# Patient Record
Sex: FEMALE | Race: WHITE | NOT HISPANIC OR LATINO | Employment: UNEMPLOYED | URBAN - METROPOLITAN AREA
[De-identification: names, ages, dates, MRNs, and addresses within clinical notes are randomized per-mention and may not be internally consistent; named-entity substitution may affect disease eponyms.]

---

## 2023-11-21 ENCOUNTER — LAB (OUTPATIENT)
Dept: LAB | Facility: LAB | Age: 63
End: 2023-11-21
Payer: COMMERCIAL

## 2023-11-21 DIAGNOSIS — E78.5 HYPERLIPIDEMIA, UNSPECIFIED: Primary | ICD-10-CM

## 2023-11-21 DIAGNOSIS — I10 ESSENTIAL (PRIMARY) HYPERTENSION: ICD-10-CM

## 2023-11-21 LAB
ALBUMIN SERPL BCP-MCNC: 4.5 G/DL (ref 3.4–5)
ALP SERPL-CCNC: 133 U/L (ref 33–136)
ALT SERPL W P-5'-P-CCNC: 14 U/L (ref 7–45)
ANION GAP SERPL CALC-SCNC: 15 MMOL/L (ref 10–20)
AST SERPL W P-5'-P-CCNC: 14 U/L (ref 9–39)
BILIRUB SERPL-MCNC: 0.9 MG/DL (ref 0–1.2)
BUN SERPL-MCNC: 13 MG/DL (ref 6–23)
CALCIUM SERPL-MCNC: 9.8 MG/DL (ref 8.6–10.3)
CHLORIDE SERPL-SCNC: 102 MMOL/L (ref 98–107)
CHOLEST SERPL-MCNC: 146 MG/DL (ref 0–199)
CHOLESTEROL/HDL RATIO: 3.7
CO2 SERPL-SCNC: 28 MMOL/L (ref 21–32)
CREAT SERPL-MCNC: 0.9 MG/DL (ref 0.5–1.05)
GFR SERPL CREATININE-BSD FRML MDRD: 72 ML/MIN/1.73M*2
GLUCOSE SERPL-MCNC: 119 MG/DL (ref 74–99)
HDLC SERPL-MCNC: 39.1 MG/DL
LDLC SERPL CALC-MCNC: 81 MG/DL
NON HDL CHOLESTEROL: 107 MG/DL (ref 0–149)
POTASSIUM SERPL-SCNC: 5 MMOL/L (ref 3.5–5.3)
PROT SERPL-MCNC: 7.4 G/DL (ref 6.4–8.2)
SODIUM SERPL-SCNC: 140 MMOL/L (ref 136–145)
TRIGL SERPL-MCNC: 132 MG/DL (ref 0–149)
VLDL: 26 MG/DL (ref 0–40)

## 2023-11-21 PROCEDURE — 80061 LIPID PANEL: CPT

## 2023-11-21 PROCEDURE — 36415 COLL VENOUS BLD VENIPUNCTURE: CPT

## 2023-11-21 PROCEDURE — 80053 COMPREHEN METABOLIC PANEL: CPT

## 2023-12-07 ENCOUNTER — OFFICE VISIT (OUTPATIENT)
Dept: CARDIOLOGY | Facility: CLINIC | Age: 63
End: 2023-12-07
Payer: COMMERCIAL

## 2023-12-07 VITALS
WEIGHT: 288 LBS | HEIGHT: 64 IN | DIASTOLIC BLOOD PRESSURE: 80 MMHG | HEART RATE: 60 BPM | SYSTOLIC BLOOD PRESSURE: 136 MMHG | BODY MASS INDEX: 49.17 KG/M2

## 2023-12-07 DIAGNOSIS — I51.7 LVH (LEFT VENTRICULAR HYPERTROPHY): ICD-10-CM

## 2023-12-07 DIAGNOSIS — E11.9 DIABETES MELLITUS TYPE II, NON INSULIN DEPENDENT (MULTI): ICD-10-CM

## 2023-12-07 DIAGNOSIS — G47.33 OBSTRUCTIVE SLEEP APNEA: ICD-10-CM

## 2023-12-07 DIAGNOSIS — I25.10 CAD S/P PERCUTANEOUS CORONARY ANGIOPLASTY: ICD-10-CM

## 2023-12-07 DIAGNOSIS — I10 ESSENTIAL HYPERTENSION, BENIGN: ICD-10-CM

## 2023-12-07 DIAGNOSIS — E78.2 MIXED HYPERLIPIDEMIA: Primary | ICD-10-CM

## 2023-12-07 DIAGNOSIS — Z98.61 CAD S/P PERCUTANEOUS CORONARY ANGIOPLASTY: ICD-10-CM

## 2023-12-07 DIAGNOSIS — Z86.79 HISTORY OF VENTRICULAR FIBRILLATION: ICD-10-CM

## 2023-12-07 DIAGNOSIS — Z87.891 FORMER SMOKER: ICD-10-CM

## 2023-12-07 DIAGNOSIS — E03.9 HYPOTHYROIDISM, UNSPECIFIED TYPE: ICD-10-CM

## 2023-12-07 PROBLEM — E78.5 HYPERLIPIDEMIA: Status: ACTIVE | Noted: 2023-12-07

## 2023-12-07 PROCEDURE — 3008F BODY MASS INDEX DOCD: CPT | Performed by: INTERNAL MEDICINE

## 2023-12-07 PROCEDURE — 99214 OFFICE O/P EST MOD 30 MIN: CPT | Performed by: INTERNAL MEDICINE

## 2023-12-07 PROCEDURE — 4010F ACE/ARB THERAPY RXD/TAKEN: CPT | Performed by: INTERNAL MEDICINE

## 2023-12-07 PROCEDURE — 3048F LDL-C <100 MG/DL: CPT | Performed by: INTERNAL MEDICINE

## 2023-12-07 PROCEDURE — 1036F TOBACCO NON-USER: CPT | Performed by: INTERNAL MEDICINE

## 2023-12-07 PROCEDURE — 3075F SYST BP GE 130 - 139MM HG: CPT | Performed by: INTERNAL MEDICINE

## 2023-12-07 PROCEDURE — 3079F DIAST BP 80-89 MM HG: CPT | Performed by: INTERNAL MEDICINE

## 2023-12-07 RX ORDER — RANOLAZINE 500 MG/1
500 TABLET, EXTENDED RELEASE ORAL 2 TIMES DAILY
COMMUNITY
End: 2023-12-07 | Stop reason: SDUPTHER

## 2023-12-07 RX ORDER — NITROGLYCERIN 0.4 MG/1
0.4 TABLET SUBLINGUAL EVERY 5 MIN PRN
COMMUNITY
Start: 2023-07-10 | End: 2023-12-07 | Stop reason: SDUPTHER

## 2023-12-07 RX ORDER — RANOLAZINE 500 MG/1
500 TABLET, EXTENDED RELEASE ORAL 2 TIMES DAILY
Qty: 180 TABLET | Refills: 3 | Status: SHIPPED | OUTPATIENT
Start: 2023-12-07 | End: 2024-12-06

## 2023-12-07 RX ORDER — ATORVASTATIN CALCIUM 40 MG/1
80 TABLET, FILM COATED ORAL DAILY
COMMUNITY
Start: 2019-04-06 | End: 2023-12-07 | Stop reason: SDUPTHER

## 2023-12-07 RX ORDER — ISOSORBIDE MONONITRATE 30 MG/1
90 TABLET, EXTENDED RELEASE ORAL DAILY
COMMUNITY
End: 2023-12-07 | Stop reason: SDUPTHER

## 2023-12-07 RX ORDER — LISINOPRIL 10 MG/1
10 TABLET ORAL DAILY
Qty: 90 TABLET | Refills: 3 | Status: SHIPPED | OUTPATIENT
Start: 2023-12-07 | End: 2024-12-06

## 2023-12-07 RX ORDER — EZETIMIBE 10 MG/1
10 TABLET ORAL DAILY
Qty: 90 TABLET | Refills: 3 | Status: SHIPPED | OUTPATIENT
Start: 2023-12-07 | End: 2024-12-06

## 2023-12-07 RX ORDER — AMLODIPINE BESYLATE 10 MG/1
10 TABLET ORAL DAILY
COMMUNITY
Start: 2023-07-10 | End: 2023-12-07 | Stop reason: SDUPTHER

## 2023-12-07 RX ORDER — NAPROXEN SODIUM 220 MG/1
TABLET, FILM COATED ORAL DAILY
COMMUNITY

## 2023-12-07 RX ORDER — METOPROLOL TARTRATE 25 MG/1
25 TABLET, FILM COATED ORAL 2 TIMES DAILY
COMMUNITY
Start: 2023-07-10 | End: 2023-12-07 | Stop reason: SDUPTHER

## 2023-12-07 RX ORDER — TIZANIDINE 2 MG/1
2 TABLET ORAL EVERY 6 HOURS PRN
COMMUNITY
Start: 2023-07-10

## 2023-12-07 RX ORDER — ATORVASTATIN CALCIUM 80 MG/1
80 TABLET, FILM COATED ORAL DAILY
Qty: 90 TABLET | Refills: 3 | Status: SHIPPED | OUTPATIENT
Start: 2023-12-07 | End: 2024-12-06

## 2023-12-07 RX ORDER — METFORMIN HYDROCHLORIDE 500 MG/1
500 TABLET ORAL
COMMUNITY
Start: 2023-07-10

## 2023-12-07 RX ORDER — CLOPIDOGREL BISULFATE 75 MG/1
75 TABLET ORAL DAILY
COMMUNITY
End: 2023-12-07 | Stop reason: ALTCHOICE

## 2023-12-07 RX ORDER — LISINOPRIL 10 MG/1
10 TABLET ORAL DAILY
COMMUNITY
Start: 2023-07-10 | End: 2023-12-07 | Stop reason: SDUPTHER

## 2023-12-07 RX ORDER — ISOSORBIDE MONONITRATE 30 MG/1
90 TABLET, EXTENDED RELEASE ORAL DAILY
Qty: 270 TABLET | Refills: 3 | Status: SHIPPED | OUTPATIENT
Start: 2023-12-07 | End: 2024-12-06

## 2023-12-07 RX ORDER — METOPROLOL TARTRATE 25 MG/1
25 TABLET, FILM COATED ORAL 2 TIMES DAILY
Qty: 180 TABLET | Refills: 3 | Status: SHIPPED | OUTPATIENT
Start: 2023-12-07 | End: 2024-12-06

## 2023-12-07 RX ORDER — NITROGLYCERIN 0.4 MG/1
0.4 TABLET SUBLINGUAL EVERY 5 MIN PRN
Qty: 25 TABLET | Refills: 5 | Status: SHIPPED | OUTPATIENT
Start: 2023-12-07 | End: 2024-12-06

## 2023-12-07 RX ORDER — AMLODIPINE BESYLATE 10 MG/1
10 TABLET ORAL DAILY
Qty: 90 TABLET | Refills: 3 | Status: SHIPPED | OUTPATIENT
Start: 2023-12-07 | End: 2024-12-06

## 2023-12-07 NOTE — PATIENT INSTRUCTIONS
CHECK YOUR BLOOD PRESSURE 3 TIMES A DAY FOR 2 WEEKS PRIOR TO YOUR NEXT OFFICE VISIT. BRING YOUR BP READINGS AND YOUR BP MACHINE WITH YOU TO YOUR VISIT.    STOP TAKING PLAVIX  START ZETIA 10 MG DAILY    STRESS TEST (GXT) IN 1 YEAR    FOLLOW UP IN 1 YEAR  OBTAIN LAB WORK PRIOR TO THIS VISIT, THIS WILL BE FASTING

## 2023-12-07 NOTE — PROGRESS NOTES
CARDIOLOGY OFFICE VISIT      CHIEF COMPLAINT  Chief Complaint   Patient presents with    Follow-up     1  YEAR        HISTORY OF PRESENT ILLNESS   The patient is a 63-year-old  female with past medical history significant for coronary artery disease status post non-ST elevation myocardial infarction, status post drug-eluting stent to right coronary artery on 2018, dyslipidemia, tobacco abuse (ceased 2018), hyperglycemia, and  morbid obesity, who presents for followup cardiovascular care.      Patient has chronic occasional palpitation lasting seconds usually in the evening, no change.  Denies chest pain, dyspnea exertion. Occasional dizziness without near syncope or leah syncope. Denies nausea, vomiting, edema, claudication.  No routine exercise program.  Home blood pressure readings are under control.          PAST MEDICAL HISTORY: As above plus polycystic ovary disease,  hypothyroid, and hyperglycemia.     PAST SURGICAL HISTORY: Ovarian cyst resection, hernia repair.     SOCIAL HISTORY: The patient quit smoking in 2018, smoked 1  pack of cigarettes per day for the past 12 years. Denies alcohol use.     FAMILY HISTORY: Dad  at 78. I had coronary artery disease,  CABG in his 40s, redo CABG, COPD, hypertension. Mom is  at  82 with coronary disease/PCI in her late 60s, pacemaker and renal  failure.     REVIEW OF SYSTEMS: Negative, noncontributory as previously mentioned  x12 systems.     ASSESSMENT:   Coronary artery disease, status post non-ST elevation myocardial infarction, status post drug-eluting stent to right coronary artery on 2018 with chronic stable angina  History of ventricular fibrillation during cardiac catheterization  Intolerance to myocardial perfusion stress test due to claustrophobia/panic attacks  Dyslipidemia.  Diabetes mellitus.  Hypertension.  Left ventricular hypertrophy  Tobacco abuse - ceased in 2018.  Anxiety.  Polycystic ovary  disease.  Hypothyroid.  NEDRA on CPAP  Claustrophobia  Intolerance to Brilinta due to dyspnea     RECOMMENDATIONS:   Patient peers to be stable from a cardiac standpoint. No symptoms of angina.  Stop Plavix, continue aspirin.  No evidence of heart failure. Advise diet, weight loss, exercise program 30 minutes/day. Blood pressure under control.  Add Zetia 10 mg daily.  Advise maintain blood pressure diary prior to future office visits. Advise diet, weight loss, exercise program 30 minutes/day. Follow-up with myself in 1 year. Check graded exercise stress test.,  CMP, lipid profile at that time.     Please excuse any errors in grammar or translation related to this dictation. Voice recognition software was utilized to prepare this document.        Recent Cardiovascular Testing:  The following results have been reviewed and updated.      labs 11/21/23  1., , HDL 39, LDL 81      24 hr Holter 3/20/21  1.NSR average HR 57, Min HR 41 bpm  3.Occasional PVC and PAC's.         Cath 3/22/21  1.Mid 2nd Diag. LAD 70%  2.EF 60 %     Echo 3/21/21  1. EF 60-65%  2.Mild concentric LVH        Sleep study: 12/4/18  1. Moderately severe NEDRA, very severe in REM sleep.     MPX 10/27/20  1.73% of MPHR  2.5.4 METS  3.No significant ECG changes   4.No chest pain  5.NO images obtained secondary to panic attacks.       VITALS  Vitals:    12/07/23 1101   BP: 136/80   Pulse: 60     Wt Readings from Last 4 Encounters:   12/07/23 131 kg (288 lb)   02/23/23 135 kg (298 lb)   12/08/22 134 kg (294 lb 7 oz)   12/02/21 134 kg (296 lb)       PHYSICAL EXAM:  GENERAL:  Well developed, well nourished, in no acute distress.  CHEST:  Symmetric and nontender.  NEURO/PSYCH:  Alert and oriented times three with approppriate behavior and responses.  NECK:  Supple, no JVD, no bruit.  LUNGS:  Clear to auscultation bilaterally, normal respiratory effort.  HEART:  Rate and rhythm regular with no evident murmur, no gallop appreciated.                    There are no rubs, clicks or heaves.  EXTREMITIES:  Warm with good color, no clubbing or cyanosis.  There is no edema noted.  PERIPHERAL VASCULAR:  Pulses present and equally palpable; 2+ throughout.    ASSESSMENT AND PLAN  Diagnoses and all orders for this visit:  Mixed hyperlipidemia  CAD S/P percutaneous coronary angioplasty  History of ventricular fibrillation  Diabetes mellitus type II, non insulin dependent (CMS/HCC)  Essential hypertension, benign  LVH (left ventricular hypertrophy)  Hypothyroidism, unspecified type  Obstructive sleep apnea  BMI 45.0-49.9, adult (CMS/[facility])  Former smoker      Past Medical History  Past Medical History:   Diagnosis Date    Personal history of other endocrine, nutritional and metabolic disease     History of hyperglycemia    Personal history of other endocrine, nutritional and metabolic disease     History of hypothyroidism       Social History  Social History     Tobacco Use    Smoking status: Former     Types: Cigarettes    Smokeless tobacco: Never   Substance Use Topics    Alcohol use: Never    Drug use: Never       Family History     Family History   Problem Relation Name Age of Onset    Heart disease Mother      Heart disease Father          Allergies:  Allergies   Allergen Reactions    Brilinta [Ticagrelor] Shortness of breath    Penicillin Itching        Outpatient Medications:  Current Outpatient Medications   Medication Instructions    amLODIPine (NORVASC) 10 mg, oral, Daily    aspirin 81 mg chewable tablet oral, Daily    atorvastatin (LIPITOR) 80 mg, oral, Daily    clopidogrel (PLAVIX) 75 mg, oral, Daily    isosorbide mononitrate ER (IMDUR) 90 mg, oral, Daily    lisinopril 10 mg, oral, Daily    metFORMIN (GLUCOPHAGE) 500 mg, oral    metoprolol tartrate (LOPRESSOR) 25 mg, oral, 2 times daily    nitroglycerin (NITROSTAT) 0.4 mg, sublingual, Every 5 min PRN    ranolazine (RANEXA) 500 mg, oral, 2 times daily    tiZANidine (ZANAFLEX) 2 mg, oral, Every 6 hours PRN         Recent Lab Results:    CMP:    Lab Results   Component Value Date     11/21/2023    K 5.0 11/21/2023     11/21/2023    CO2 28 11/21/2023    BUN 13 11/21/2023    CREATININE 0.90 11/21/2023    GLUCOSE 119 (H) 11/21/2023    CALCIUM 9.8 11/21/2023       Magnesium:    Lab Results   Component Value Date    MG 1.80 04/22/2021       Lipid Profile:    Lab Results   Component Value Date    TRIG 132 11/21/2023    HDL 39.1 11/21/2023    LDLCALC 81 11/21/2023       TSH:    Lab Results   Component Value Date    TSH 1.33 03/22/2021       BNP:   Lab Results   Component Value Date    BNP 38 03/21/2021        PT/INR:    Lab Results   Component Value Date    PROTIME 12.5 03/21/2021    INR 1.1 03/21/2021       HgBA1c:    Lab Results   Component Value Date    HGBA1C 5.9 (A) 05/26/2022       BMP:  Lab Results   Component Value Date     11/21/2023     11/01/2022     05/26/2022     04/22/2021    K 5.0 11/21/2023    K 4.5 11/01/2022    K 4.4 05/26/2022    K 4.6 04/22/2021     11/21/2023     11/01/2022     05/26/2022     04/22/2021    CO2 28 11/21/2023    CO2 29 11/01/2022    CO2 28 05/26/2022    CO2 29 04/22/2021    BUN 13 11/21/2023    BUN 16 11/01/2022    BUN 12 05/26/2022    BUN 17 04/22/2021    CREATININE 0.90 11/21/2023    CREATININE 0.87 11/01/2022    CREATININE 0.88 05/26/2022    CREATININE 0.95 04/22/2021       CBC:  Lab Results   Component Value Date    WBC 5.7 05/26/2022    WBC 6.0 04/22/2021    WBC 6.7 03/23/2021    RBC 4.43 05/26/2022    RBC 4.45 04/22/2021    RBC 4.15 03/23/2021    HGB 12.6 05/26/2022    HGB 12.4 04/22/2021    HGB 11.5 (L) 03/23/2021    HCT 39.5 05/26/2022    HCT 39.4 04/22/2021    HCT 36.0 03/23/2021    MCV 89 05/26/2022    MCV 89 04/22/2021    MCV 87 03/23/2021    MCHC 31.9 (L) 05/26/2022    MCHC 31.5 (L) 04/22/2021    MCHC 31.9 (L) 03/23/2021    RDW 13.7 05/26/2022    RDW 14.2 04/22/2021    RDW 13.6 03/23/2021     05/26/2022      "04/22/2021     03/23/2021       Cardiac Enzymes:    No results found for: \"TROPHS\"    Hepatic Function Panel:    Lab Results   Component Value Date    ALKPHOS 133 11/21/2023    ALT 14 11/21/2023    AST 14 11/21/2023    PROT 7.4 11/21/2023    BILITOT 0.9 11/21/2023           Kain Walters, DO        "

## 2024-01-16 ENCOUNTER — APPOINTMENT (OUTPATIENT)
Dept: RADIOLOGY | Facility: HOSPITAL | Age: 64
End: 2024-01-16
Payer: COMMERCIAL

## 2024-02-13 ENCOUNTER — HOSPITAL ENCOUNTER (OUTPATIENT)
Dept: RADIOLOGY | Facility: HOSPITAL | Age: 64
Discharge: HOME | End: 2024-02-13
Payer: COMMERCIAL

## 2024-02-13 DIAGNOSIS — Z12.31 ENCOUNTER FOR SCREENING MAMMOGRAM FOR MALIGNANT NEOPLASM OF BREAST: ICD-10-CM

## 2024-02-13 PROCEDURE — 77067 SCR MAMMO BI INCL CAD: CPT | Performed by: RADIOLOGY

## 2024-02-13 PROCEDURE — 77063 BREAST TOMOSYNTHESIS BI: CPT

## 2024-02-13 PROCEDURE — 77063 BREAST TOMOSYNTHESIS BI: CPT | Performed by: RADIOLOGY

## 2024-10-31 DIAGNOSIS — I10 ESSENTIAL HYPERTENSION, BENIGN: ICD-10-CM

## 2024-11-01 RX ORDER — LISINOPRIL 10 MG/1
10 TABLET ORAL DAILY
Qty: 90 TABLET | Refills: 3 | Status: SHIPPED | OUTPATIENT
Start: 2024-11-01 | End: 2025-11-01

## 2024-11-25 ENCOUNTER — LAB (OUTPATIENT)
Dept: LAB | Facility: LAB | Age: 64
End: 2024-11-25
Payer: COMMERCIAL

## 2024-11-25 DIAGNOSIS — E78.2 MIXED HYPERLIPIDEMIA: ICD-10-CM

## 2024-11-25 DIAGNOSIS — E83.42 HYPOMAGNESEMIA: ICD-10-CM

## 2024-11-25 DIAGNOSIS — I10 ESSENTIAL (PRIMARY) HYPERTENSION: ICD-10-CM

## 2024-11-25 DIAGNOSIS — K76.0 FATTY (CHANGE OF) LIVER, NOT ELSEWHERE CLASSIFIED: ICD-10-CM

## 2024-11-25 DIAGNOSIS — I25.2 OLD MYOCARDIAL INFARCTION: ICD-10-CM

## 2024-11-25 DIAGNOSIS — R69 ILLNESS, UNSPECIFIED: Primary | ICD-10-CM

## 2024-11-25 DIAGNOSIS — Z13.21 ENCOUNTER FOR SCREENING FOR NUTRITIONAL DISORDER: ICD-10-CM

## 2024-11-25 DIAGNOSIS — I10 ESSENTIAL HYPERTENSION, BENIGN: ICD-10-CM

## 2024-11-25 DIAGNOSIS — I25.10 ATHEROSCLEROTIC HEART DISEASE OF NATIVE CORONARY ARTERY WITHOUT ANGINA PECTORIS: ICD-10-CM

## 2024-11-25 DIAGNOSIS — K21.9 GASTRO-ESOPHAGEAL REFLUX DISEASE WITHOUT ESOPHAGITIS: ICD-10-CM

## 2024-11-25 DIAGNOSIS — E11.9 TYPE 2 DIABETES MELLITUS WITHOUT COMPLICATIONS (MULTI): ICD-10-CM

## 2024-11-25 LAB
25(OH)D3 SERPL-MCNC: 17 NG/ML (ref 30–100)
ALBUMIN SERPL BCP-MCNC: 4.4 G/DL (ref 3.4–5)
ALBUMIN SERPL BCP-MCNC: 4.5 G/DL (ref 3.4–5)
ALP SERPL-CCNC: 144 U/L (ref 33–136)
ALP SERPL-CCNC: 148 U/L (ref 33–136)
ALT SERPL W P-5'-P-CCNC: 13 U/L (ref 7–45)
ALT SERPL W P-5'-P-CCNC: 13 U/L (ref 7–45)
ANION GAP SERPL CALC-SCNC: 10 MMOL/L (ref 10–20)
ANION GAP SERPL CALC-SCNC: 11 MMOL/L (ref 10–20)
AST SERPL W P-5'-P-CCNC: 13 U/L (ref 9–39)
AST SERPL W P-5'-P-CCNC: 13 U/L (ref 9–39)
BASOPHILS # BLD AUTO: 0.05 X10*3/UL (ref 0–0.1)
BASOPHILS NFR BLD AUTO: 0.9 %
BILIRUB SERPL-MCNC: 0.9 MG/DL (ref 0–1.2)
BILIRUB SERPL-MCNC: 0.9 MG/DL (ref 0–1.2)
BUN SERPL-MCNC: 13 MG/DL (ref 6–23)
BUN SERPL-MCNC: 14 MG/DL (ref 6–23)
CALCIUM SERPL-MCNC: 9.4 MG/DL (ref 8.6–10.3)
CALCIUM SERPL-MCNC: 9.4 MG/DL (ref 8.6–10.3)
CHLORIDE SERPL-SCNC: 103 MMOL/L (ref 98–107)
CHLORIDE SERPL-SCNC: 103 MMOL/L (ref 98–107)
CHOLEST SERPL-MCNC: 123 MG/DL (ref 0–199)
CHOLEST SERPL-MCNC: 129 MG/DL (ref 0–199)
CHOLESTEROL/HDL RATIO: 3
CHOLESTEROL/HDL RATIO: 3.1
CO2 SERPL-SCNC: 30 MMOL/L (ref 21–32)
CO2 SERPL-SCNC: 30 MMOL/L (ref 21–32)
CREAT SERPL-MCNC: 0.87 MG/DL (ref 0.5–1.05)
CREAT SERPL-MCNC: 0.87 MG/DL (ref 0.5–1.05)
EGFRCR SERPLBLD CKD-EPI 2021: 75 ML/MIN/1.73M*2
EGFRCR SERPLBLD CKD-EPI 2021: 75 ML/MIN/1.73M*2
EOSINOPHIL # BLD AUTO: 0.08 X10*3/UL (ref 0–0.7)
EOSINOPHIL NFR BLD AUTO: 1.4 %
ERYTHROCYTE [DISTWIDTH] IN BLOOD BY AUTOMATED COUNT: 14.3 % (ref 11.5–14.5)
EST. AVERAGE GLUCOSE BLD GHB EST-MCNC: 120 MG/DL
GLUCOSE SERPL-MCNC: 118 MG/DL (ref 74–99)
GLUCOSE SERPL-MCNC: 118 MG/DL (ref 74–99)
HBA1C MFR BLD: 5.8 %
HCT VFR BLD AUTO: 38.5 % (ref 36–46)
HDLC SERPL-MCNC: 40.7 MG/DL
HDLC SERPL-MCNC: 41.4 MG/DL
HGB BLD-MCNC: 12.6 G/DL (ref 12–16)
IMM GRANULOCYTES # BLD AUTO: 0.02 X10*3/UL (ref 0–0.7)
IMM GRANULOCYTES NFR BLD AUTO: 0.3 % (ref 0–0.9)
LDLC SERPL CALC-MCNC: 64 MG/DL
LDLC SERPL CALC-MCNC: 70 MG/DL
LYMPHOCYTES # BLD AUTO: 1.46 X10*3/UL (ref 1.2–4.8)
LYMPHOCYTES NFR BLD AUTO: 25.1 %
MAGNESIUM SERPL-MCNC: 1.77 MG/DL (ref 1.6–2.4)
MCH RBC QN AUTO: 28.4 PG (ref 26–34)
MCHC RBC AUTO-ENTMCNC: 32.7 G/DL (ref 32–36)
MCV RBC AUTO: 87 FL (ref 80–100)
MONOCYTES # BLD AUTO: 0.53 X10*3/UL (ref 0.1–1)
MONOCYTES NFR BLD AUTO: 9.1 %
NEUTROPHILS # BLD AUTO: 3.67 X10*3/UL (ref 1.2–7.7)
NEUTROPHILS NFR BLD AUTO: 63.2 %
NON HDL CHOLESTEROL: 82 MG/DL (ref 0–149)
NON HDL CHOLESTEROL: 88 MG/DL (ref 0–149)
NRBC BLD-RTO: 0 /100 WBCS (ref 0–0)
PHOSPHATE SERPL-MCNC: 3.2 MG/DL (ref 2.5–4.9)
PLATELET # BLD AUTO: 216 X10*3/UL (ref 150–450)
POTASSIUM SERPL-SCNC: 4.5 MMOL/L (ref 3.5–5.3)
POTASSIUM SERPL-SCNC: 4.5 MMOL/L (ref 3.5–5.3)
PROT SERPL-MCNC: 7.1 G/DL (ref 6.4–8.2)
PROT SERPL-MCNC: 7.2 G/DL (ref 6.4–8.2)
RBC # BLD AUTO: 4.44 X10*6/UL (ref 4–5.2)
SODIUM SERPL-SCNC: 138 MMOL/L (ref 136–145)
SODIUM SERPL-SCNC: 139 MMOL/L (ref 136–145)
T3 SERPL-MCNC: 152 NG/DL (ref 60–200)
T4 FREE SERPL-MCNC: 0.78 NG/DL (ref 0.61–1.12)
TRIGL SERPL-MCNC: 90 MG/DL (ref 0–149)
TRIGL SERPL-MCNC: 90 MG/DL (ref 0–149)
TSH SERPL-ACNC: 1.06 MIU/L (ref 0.44–3.98)
VIT B12 SERPL-MCNC: 196 PG/ML (ref 211–911)
VLDL: 18 MG/DL (ref 0–40)
VLDL: 18 MG/DL (ref 0–40)
WBC # BLD AUTO: 5.8 X10*3/UL (ref 4.4–11.3)

## 2024-11-25 PROCEDURE — 80053 COMPREHEN METABOLIC PANEL: CPT

## 2024-11-25 PROCEDURE — 84443 ASSAY THYROID STIM HORMONE: CPT

## 2024-11-25 PROCEDURE — 84480 ASSAY TRIIODOTHYRONINE (T3): CPT

## 2024-11-25 PROCEDURE — 85025 COMPLETE CBC W/AUTO DIFF WBC: CPT

## 2024-11-25 PROCEDURE — 36415 COLL VENOUS BLD VENIPUNCTURE: CPT

## 2024-11-25 PROCEDURE — 84100 ASSAY OF PHOSPHORUS: CPT

## 2024-11-25 PROCEDURE — 84439 ASSAY OF FREE THYROXINE: CPT

## 2024-11-25 PROCEDURE — 80061 LIPID PANEL: CPT

## 2024-11-25 PROCEDURE — 82607 VITAMIN B-12: CPT

## 2024-11-25 PROCEDURE — 82306 VITAMIN D 25 HYDROXY: CPT

## 2024-11-25 PROCEDURE — 83036 HEMOGLOBIN GLYCOSYLATED A1C: CPT

## 2024-11-25 PROCEDURE — 83735 ASSAY OF MAGNESIUM: CPT

## 2024-11-30 DIAGNOSIS — I10 ESSENTIAL HYPERTENSION, BENIGN: ICD-10-CM

## 2024-11-30 DIAGNOSIS — E78.2 MIXED HYPERLIPIDEMIA: ICD-10-CM

## 2024-11-30 DIAGNOSIS — I25.10 CAD S/P PERCUTANEOUS CORONARY ANGIOPLASTY: ICD-10-CM

## 2024-11-30 DIAGNOSIS — Z98.61 CAD S/P PERCUTANEOUS CORONARY ANGIOPLASTY: ICD-10-CM

## 2024-12-02 RX ORDER — ATORVASTATIN CALCIUM 80 MG/1
80 TABLET, FILM COATED ORAL DAILY
Qty: 90 TABLET | Refills: 2 | Status: SHIPPED | OUTPATIENT
Start: 2024-12-02

## 2024-12-02 RX ORDER — AMLODIPINE BESYLATE 10 MG/1
10 TABLET ORAL DAILY
Qty: 90 TABLET | Refills: 3 | Status: SHIPPED | OUTPATIENT
Start: 2024-12-02

## 2024-12-02 RX ORDER — METOPROLOL TARTRATE 25 MG/1
25 TABLET, FILM COATED ORAL 2 TIMES DAILY
Qty: 180 TABLET | Refills: 3 | Status: SHIPPED | OUTPATIENT
Start: 2024-12-02

## 2024-12-02 RX ORDER — RANOLAZINE 500 MG/1
500 TABLET, EXTENDED RELEASE ORAL 2 TIMES DAILY
Qty: 180 TABLET | Refills: 3 | Status: SHIPPED | OUTPATIENT
Start: 2024-12-02

## 2024-12-02 RX ORDER — EZETIMIBE 10 MG/1
10 TABLET ORAL DAILY
Qty: 90 TABLET | Refills: 3 | Status: SHIPPED | OUTPATIENT
Start: 2024-12-02

## 2024-12-02 NOTE — TELEPHONE ENCOUNTER
Received request for prescription refills for patient.   Patient follows with Dr. Walters    Request is for Ranexa, lipitor, amlodipine, zetia and lopressor  Is patient currently on medication yes    Last OV 12/7/2023  Next OV 12/06/2024    Pended for signing and sent to provider

## 2024-12-06 ENCOUNTER — HOSPITAL ENCOUNTER (OUTPATIENT)
Dept: CARDIOLOGY | Facility: CLINIC | Age: 64
Discharge: HOME | End: 2024-12-06
Payer: COMMERCIAL

## 2024-12-06 DIAGNOSIS — I25.10 CAD S/P PERCUTANEOUS CORONARY ANGIOPLASTY: ICD-10-CM

## 2024-12-06 DIAGNOSIS — Z98.61 CAD S/P PERCUTANEOUS CORONARY ANGIOPLASTY: ICD-10-CM

## 2024-12-06 PROCEDURE — 93017 CV STRESS TEST TRACING ONLY: CPT

## 2024-12-12 ENCOUNTER — APPOINTMENT (OUTPATIENT)
Dept: CARDIOLOGY | Facility: CLINIC | Age: 64
End: 2024-12-12
Payer: COMMERCIAL

## 2024-12-12 VITALS
WEIGHT: 293 LBS | HEART RATE: 50 BPM | DIASTOLIC BLOOD PRESSURE: 70 MMHG | SYSTOLIC BLOOD PRESSURE: 110 MMHG | BODY MASS INDEX: 50.02 KG/M2 | HEIGHT: 64 IN

## 2024-12-12 DIAGNOSIS — G47.33 OBSTRUCTIVE SLEEP APNEA: ICD-10-CM

## 2024-12-12 DIAGNOSIS — I51.7 LVH (LEFT VENTRICULAR HYPERTROPHY): ICD-10-CM

## 2024-12-12 DIAGNOSIS — Z86.79 HISTORY OF VENTRICULAR FIBRILLATION: ICD-10-CM

## 2024-12-12 DIAGNOSIS — Z98.61 CAD S/P PERCUTANEOUS CORONARY ANGIOPLASTY: ICD-10-CM

## 2024-12-12 DIAGNOSIS — I10 ESSENTIAL HYPERTENSION, BENIGN: ICD-10-CM

## 2024-12-12 DIAGNOSIS — I25.10 CAD S/P PERCUTANEOUS CORONARY ANGIOPLASTY: ICD-10-CM

## 2024-12-12 DIAGNOSIS — E11.9 DIABETES MELLITUS TYPE II, NON INSULIN DEPENDENT (MULTI): ICD-10-CM

## 2024-12-12 DIAGNOSIS — E78.2 MIXED HYPERLIPIDEMIA: ICD-10-CM

## 2024-12-12 PROBLEM — E66.01 MORBID OBESITY WITH BMI OF 50.0-59.9, ADULT (MULTI): Status: ACTIVE | Noted: 2023-12-07

## 2024-12-12 PROCEDURE — 3008F BODY MASS INDEX DOCD: CPT | Performed by: INTERNAL MEDICINE

## 2024-12-12 PROCEDURE — 3044F HG A1C LEVEL LT 7.0%: CPT | Performed by: INTERNAL MEDICINE

## 2024-12-12 PROCEDURE — 1036F TOBACCO NON-USER: CPT | Performed by: INTERNAL MEDICINE

## 2024-12-12 PROCEDURE — 4010F ACE/ARB THERAPY RXD/TAKEN: CPT | Performed by: INTERNAL MEDICINE

## 2024-12-12 PROCEDURE — 99214 OFFICE O/P EST MOD 30 MIN: CPT | Performed by: INTERNAL MEDICINE

## 2024-12-12 PROCEDURE — 3048F LDL-C <100 MG/DL: CPT | Performed by: INTERNAL MEDICINE

## 2024-12-12 PROCEDURE — 3074F SYST BP LT 130 MM HG: CPT | Performed by: INTERNAL MEDICINE

## 2024-12-12 PROCEDURE — 3078F DIAST BP <80 MM HG: CPT | Performed by: INTERNAL MEDICINE

## 2024-12-12 RX ORDER — LISINOPRIL 10 MG/1
10 TABLET ORAL DAILY
Qty: 90 TABLET | Refills: 3 | Status: SHIPPED | OUTPATIENT
Start: 2024-12-12 | End: 2025-12-12

## 2024-12-12 RX ORDER — ISOSORBIDE MONONITRATE 30 MG/1
90 TABLET, EXTENDED RELEASE ORAL DAILY
Qty: 270 TABLET | Refills: 3 | Status: SHIPPED | OUTPATIENT
Start: 2024-12-12 | End: 2025-12-12

## 2024-12-12 RX ORDER — NITROGLYCERIN 0.4 MG/1
0.4 TABLET SUBLINGUAL EVERY 5 MIN PRN
Qty: 25 TABLET | Refills: 5 | Status: SHIPPED | OUTPATIENT
Start: 2024-12-12 | End: 2025-12-12

## 2024-12-12 RX ORDER — EZETIMIBE 10 MG/1
10 TABLET ORAL DAILY
Qty: 90 TABLET | Refills: 3 | Status: SHIPPED | OUTPATIENT
Start: 2024-12-12

## 2024-12-12 RX ORDER — RANOLAZINE 500 MG/1
500 TABLET, EXTENDED RELEASE ORAL 2 TIMES DAILY
Qty: 180 TABLET | Refills: 3 | Status: SHIPPED | OUTPATIENT
Start: 2024-12-12

## 2024-12-12 RX ORDER — ATORVASTATIN CALCIUM 80 MG/1
80 TABLET, FILM COATED ORAL DAILY
Qty: 90 TABLET | Refills: 3 | Status: SHIPPED | OUTPATIENT
Start: 2024-12-12

## 2024-12-12 RX ORDER — METOPROLOL TARTRATE 25 MG/1
12.5 TABLET, FILM COATED ORAL 2 TIMES DAILY
Qty: 90 TABLET | Refills: 3 | Status: SHIPPED | OUTPATIENT
Start: 2024-12-12 | End: 2025-12-12

## 2024-12-12 NOTE — PROGRESS NOTES
CARDIOLOGY OFFICE VISIT      CHIEF COMPLAINT  Chief Complaint   Patient presents with    Follow-up     Patient is Present for 1 year follow up.          HISTORY OF PRESENT ILLNESS   The patient is a 64-year-old  female with past medical history significant for coronary artery disease status post non-ST elevation myocardial infarction, status post drug-eluting stent to right coronary artery on 2018, dyslipidemia, tobacco abuse (ceased 2018), hyperglycemia, and  morbid obesity, who presents for followup cardiovascular care.      Patient's primary complaint is fatigue and dyspnea.  She underwent recent graded exercise stress test and was only able to achieve 3.4 METS and 66% of maximum predicted heart rate due to dyspnea and fatigue.  She reports that she does take a walk daily for 15 minutes.  She denies chest pain, palpitations, nausea, vomiting, dizziness, near-syncope, leah syncope, edema.  She is compliant with CPAP.  She expresses concern that lower heart rate may be contributing to her fatigue.           PAST MEDICAL HISTORY: As above plus polycystic ovary disease,  hypothyroid, and hyperglycemia.     PAST SURGICAL HISTORY: Ovarian cyst resection, hernia repair.     SOCIAL HISTORY: The patient quit smoking in 2018, smoked 1  pack of cigarettes per day for the past 12 years. Denies alcohol use.     FAMILY HISTORY: Dad  at 78. I had coronary artery disease,  CABG in his 40s, redo CABG, COPD, hypertension. Mom is  at  82 with coronary disease/PCI in her late 60s, pacemaker and renal  failure.     REVIEW OF SYSTEMS: Negative, noncontributory as previously mentioned  x12 systems.     ASSESSMENT:   Fatigue  Suboptimal/nondiagnostic GXT achieving only 3.4 METS due to dyspnea and fatigue  Coronary artery disease, status post non-ST elevation myocardial infarction, status post drug-eluting stent to right coronary artery on 2018 with chronic stable angina  Sinus  bradycardia  History of ventricular fibrillation during cardiac catheterization  Intolerance to myocardial perfusion stress test due to claustrophobia/panic attacks  Dyslipidemia.  Diabetes mellitus.  Hypertension.  Left ventricular hypertrophy  Tobacco abuse - ceased in April 2018.  Anxiety.  Polycystic ovary disease.  Hypothyroid.  NEDRA on CPAP  Claustrophobia  Intolerance to Brilinta due to dyspnea     RECOMMENDATIONS:   Patient has poor functional status.  GXT is unreliable to differentiate severe occlusive coronary disease achieving only 66% of maximum addicted heart rate.  Patient is concerned that her fatigue is related to lower heart rate.  Will decrease metoprolol to 12.5 mg twice daily.  Obtain 2-week event monitor.  If there is significant bradycardia we will stop beta-blocker.  We did discuss considering repeat cardiac catheterization due to inadequate GXT and inability to tolerate myocardial perfusion study due to claustrophobia and panic attacks.  She does have some reluctance as she had a ventricular fibrillation arrest during her previous cardiac catheterization.  She will consider it we will rediscuss after her event monitor is completed.  Follow-up in approximately 1 month.     Please excuse any errors in grammar or translation related to this dictation. Voice recognition software was utilized to prepare this document.    Recent Cardiovascular Testing:  The following results have been reviewed and updated.   labs 11/21/23  1., , HDL 39, LDL 81      24 hr Holter 3/20/21  1.NSR average HR 57, Min HR 41 bpm  3.Occasional PVC and PAC's.         Cath 3/22/21  1.Mid 2nd Diag. LAD 70%  2.EF 60 %     Echo 3/21/21  1. EF 60-65%  2.Mild concentric LVH        Sleep study: 12/4/18  1. Moderately severe NEDRA, very severe in REM sleep.     MPX 10/27/20  Sub-optimal non-diagnostic Study 66%  Achieving 3.4 METS  Stopped due to fatigue and dyspnea  No ischemia on EKG  5.Frequent PVC's      VITALS  Vitals:     12/12/24 1006   BP: 110/70   Pulse: 50     Wt Readings from Last 4 Encounters:   12/07/23 131 kg (288 lb)   02/23/23 135 kg (298 lb)   12/08/22 134 kg (294 lb 7 oz)   12/02/21 134 kg (296 lb)       PHYSICAL EXAM:  GENERAL:  Well developed, well nourished, in no acute distress.  CHEST:  Symmetric and nontender.  NEURO/PSYCH:  Alert and oriented times three with approppriate behavior and responses.  NECK:  Supple, no JVD, no bruit.  LUNGS:  Clear to auscultation bilaterally, normal respiratory effort.  HEART:  Rate and rhythm REGULAR with no evident murmur, no gallop appreciated.    There are no rubs, clicks or heaves.  EXTREMITIES:  Warm with good color, no clubbing or cyanosis.  There is no edema noted.  PERIPHERAL VASCULAR:  Pulses present and equally palpable; 2+ throughout.    ASSESSMENT AND PLAN  Problem List Items Addressed This Visit          Cardiac and Vasculature    CAD S/P percutaneous coronary angioplasty    Relevant Medications    metoprolol tartrate (Lopressor) 25 mg tablet    isosorbide mononitrate ER (Imdur) 30 mg 24 hr tablet    nitroglycerin (Nitrostat) 0.4 mg SL tablet    ranolazine (Ranexa) 500 mg 12 hr tablet    History of ventricular fibrillation    Relevant Orders    Holter or Event Cardiac Monitor    Hyperlipidemia    Relevant Medications    atorvastatin (Lipitor) 80 mg tablet    ezetimibe (Zetia) 10 mg tablet    Essential hypertension, benign    Relevant Medications    metoprolol tartrate (Lopressor) 25 mg tablet    lisinopril 10 mg tablet    LVH (left ventricular hypertrophy)    Relevant Medications    metoprolol tartrate (Lopressor) 25 mg tablet    isosorbide mononitrate ER (Imdur) 30 mg 24 hr tablet    nitroglycerin (Nitrostat) 0.4 mg SL tablet    ranolazine (Ranexa) 500 mg 12 hr tablet       Endocrine/Metabolic    Diabetes mellitus type II, non insulin dependent (Multi)       Sleep    Obstructive sleep apnea       Past Medical History  Past Medical History:   Diagnosis Date    Personal  history of other endocrine, nutritional and metabolic disease     History of hyperglycemia    Personal history of other endocrine, nutritional and metabolic disease     History of hypothyroidism       Social History  Social History     Tobacco Use    Smoking status: Former     Types: Cigarettes    Smokeless tobacco: Never   Substance Use Topics    Alcohol use: Never    Drug use: Never       Family History     Family History   Problem Relation Name Age of Onset    Breast cancer Mother      Heart disease Mother      Heart disease Father      Breast cancer Father's Sister      Ovarian cancer Niece          Allergies:  Allergies   Allergen Reactions    Brilinta [Ticagrelor] Shortness of breath    Penicillin Itching        Outpatient Medications:  Current Outpatient Medications   Medication Instructions    amLODIPine (NORVASC) 10 mg, oral, Daily    aspirin 81 mg chewable tablet oral, Daily    atorvastatin (LIPITOR) 80 mg, oral, Daily    ezetimibe (ZETIA) 10 mg, oral, Daily    isosorbide mononitrate ER (IMDUR) 90 mg, oral, Daily    lisinopril 10 mg, oral, Daily    metFORMIN (GLUCOPHAGE) 500 mg, oral    metoprolol tartrate (LOPRESSOR) 25 mg, oral, 2 times daily    nitroglycerin (NITROSTAT) 0.4 mg, sublingual, Every 5 min PRN    ranolazine (RANEXA) 500 mg, oral, 2 times daily    tiZANidine (ZANAFLEX) 2 mg, oral, Every 6 hours PRN        Recent Lab Results:    CMP:    Lab Results   Component Value Date     11/25/2024    K 4.5 11/25/2024     11/25/2024    CO2 30 11/25/2024    BUN 13 11/25/2024    CREATININE 0.87 11/25/2024    GLUCOSE 118 (H) 11/25/2024    CALCIUM 9.4 11/25/2024       Magnesium:    Lab Results   Component Value Date    MG 1.77 11/25/2024       Lipid Profile:    Lab Results   Component Value Date    TRIG 90 11/25/2024    HDL 41.4 11/25/2024    LDLCALC 70 11/25/2024       TSH:    Lab Results   Component Value Date    TSH 1.06 11/25/2024       BNP:   Lab Results   Component Value Date    BNP 38  "03/21/2021        PT/INR:    Lab Results   Component Value Date    PROTIME 12.5 03/21/2021    INR 1.1 03/21/2021       HgBA1c:    Lab Results   Component Value Date    HGBA1C 5.8 (H) 11/25/2024       BMP:  Lab Results   Component Value Date     11/25/2024     11/25/2024     11/21/2023    K 4.5 11/25/2024    K 4.5 11/25/2024    K 5.0 11/21/2023     11/25/2024     11/25/2024     11/21/2023    CO2 30 11/25/2024    CO2 30 11/25/2024    CO2 28 11/21/2023    BUN 13 11/25/2024    BUN 14 11/25/2024    BUN 13 11/21/2023    CREATININE 0.87 11/25/2024    CREATININE 0.87 11/25/2024    CREATININE 0.90 11/21/2023       CBC:  Lab Results   Component Value Date    WBC 5.8 11/25/2024    WBC 5.7 05/26/2022    WBC 6.0 04/22/2021    WBC 6.7 03/23/2021    RBC 4.44 11/25/2024    RBC 4.43 05/26/2022    RBC 4.45 04/22/2021    RBC 4.15 03/23/2021    HGB 12.6 11/25/2024    HGB 12.6 05/26/2022    HGB 12.4 04/22/2021    HGB 11.5 (L) 03/23/2021    HCT 38.5 11/25/2024    HCT 39.5 05/26/2022    HCT 39.4 04/22/2021    HCT 36.0 03/23/2021    MCV 87 11/25/2024    MCV 89 05/26/2022    MCV 89 04/22/2021    MCV 87 03/23/2021    MCH 28.4 11/25/2024    MCHC 32.7 11/25/2024    MCHC 31.9 (L) 05/26/2022    MCHC 31.5 (L) 04/22/2021    MCHC 31.9 (L) 03/23/2021    RDW 14.3 11/25/2024    RDW 13.7 05/26/2022    RDW 14.2 04/22/2021    RDW 13.6 03/23/2021     11/25/2024     05/26/2022     04/22/2021     03/23/2021       Cardiac Enzymes:    No results found for: \"TROPHS\"    Hepatic Function Panel:    Lab Results   Component Value Date    ALKPHOS 144 (H) 11/25/2024    ALT 13 11/25/2024    AST 13 11/25/2024    PROT 7.1 11/25/2024    BILITOT 0.9 11/25/2024           Kain Walters, DO        "

## 2024-12-12 NOTE — PATIENT INSTRUCTIONS
Decrease metoprolol tartrate to  12.5 mg BID  2  week Zio patch  Follow up after     Patient educated on proper medication use.   Patient educated on risk factor modification.   Please bring any lab results from other providers / physicians to your next appointment.     Please bring all medicines, vitamins, and herbal supplements with you when you come to the office.     Prescriptions will not be filled unless you are compliant with your follow up appointments or have a follow up appointment scheduled as per instruction of your physician. Refills should be requested at the time of your visit.

## 2024-12-17 ENCOUNTER — TELEPHONE (OUTPATIENT)
Dept: CARDIOLOGY | Facility: CLINIC | Age: 64
End: 2024-12-17
Payer: COMMERCIAL

## 2024-12-17 NOTE — TELEPHONE ENCOUNTER
14 day [employer] [id number] does not require prior auth per Suzy sanchez [place]-call reference#-Suzy [id number].

## 2024-12-26 ENCOUNTER — APPOINTMENT (OUTPATIENT)
Dept: CARDIOLOGY | Facility: CLINIC | Age: 64
End: 2024-12-26
Payer: COMMERCIAL

## 2024-12-26 DIAGNOSIS — Z86.79 HISTORY OF VENTRICULAR FIBRILLATION: ICD-10-CM

## 2025-01-15 PROCEDURE — 93248 EXT ECG>7D<15D REV&INTERPJ: CPT | Performed by: INTERNAL MEDICINE

## 2025-02-17 ENCOUNTER — TRANSCRIBE ORDERS (OUTPATIENT)
Dept: ADMINISTRATIVE | Age: 65
End: 2025-02-17

## 2025-02-17 DIAGNOSIS — M79.662 PAIN OF LEFT LOWER LEG: Primary | ICD-10-CM

## 2025-02-24 ENCOUNTER — HOSPITAL ENCOUNTER (OUTPATIENT)
Dept: RADIOLOGY | Facility: HOSPITAL | Age: 65
Discharge: HOME | End: 2025-02-24
Payer: COMMERCIAL

## 2025-02-24 PROCEDURE — 93971 EXTREMITY STUDY: CPT

## 2025-09-03 ENCOUNTER — TRANSCRIBE ORDERS (OUTPATIENT)
Dept: ADMINISTRATIVE | Age: 65
End: 2025-09-03

## 2025-09-03 DIAGNOSIS — M81.0 AGE-RELATED OSTEOPOROSIS WITHOUT CURRENT PATHOLOGICAL FRACTURE: Primary | ICD-10-CM

## 2025-10-10 ENCOUNTER — APPOINTMENT (OUTPATIENT)
Dept: PRIMARY CARE | Facility: CLINIC | Age: 65
End: 2025-10-10
Payer: COMMERCIAL

## 2025-10-16 ENCOUNTER — APPOINTMENT (OUTPATIENT)
Dept: CARDIOLOGY | Facility: CLINIC | Age: 65
End: 2025-10-16
Payer: COMMERCIAL